# Patient Record
(demographics unavailable — no encounter records)

---

## 2024-11-14 NOTE — HISTORY OF PRESENT ILLNESS
[FreeTextEntry1] : Patient explains arthralgias overall at bay save intermittent RT knee pain without accompanied swelling.  He denies accompanied paresthesia.  He denies morning stiffness.   Patient following PMD for uncontrolled DM with Farxiga and Metformin; he finds the latter tx lends to abd discomfort.  Patient with bone density reflecting normal bone densitometry; he continues with vitamin supplementation.  He has missed the last few months of Certolizumab injections due to insurance lapse. He otherwise denies visual disturbances, oral ulcers, shortness of breath, chest pain, motor/sensory disturbances, Raynauds, rash or fever.

## 2024-11-14 NOTE — PROCEDURE
[Today's Date:] : Date: [unfilled] [Patient] : the patient [Risks] : risks [Benefits] : benefits [Therapeutic] : therapeutic [#1 Site: ______] : #1 site identified in the [unfilled] [Alcohol] : alcohol [Tolerated Well] : the patient tolerated the procedure well [No Complications] : there were no complications [FreeTextEntry5] : Certolizumab 200mg inj x 2 syringes  injected

## 2024-11-14 NOTE — ASSESSMENT
[FreeTextEntry1] : Patient with RA and joint contractures on biologic tx:  Patient to c/w Certolizumab, drug therapy administered in the office today and understands the increased risk of serious infections related to Biologic therapy and importance of annual vaccinations,  He would like to hold off on Flu vaccination today.  He understands increased risk of cardiovascular events in patients with inflammatory arthritides if uncontrolled, and understands the importance of sugar, lipid and blood pressure control. Surveillance labs requested.  Patient to continue with range of motion and stretching exercises to help relieve pain in the knees secondary to early OA as well as destructive changes from RA. NSAID tx to be taken judiciously.  Samples of emollient given. Elbow sleeves encouraged for joint stabilization. Quadriceps strengthening exercises demonstrated. PT strongly encouraged to help with contractures. Bone density up to date and reviewed; patient to continue with vitamin supplementation.  He is in agreement with the above plan and will return to the office within one months' time.

## 2024-11-14 NOTE — REVIEW OF SYSTEMS
[Chills] : chills [Arthralgias] : arthralgias [Joint Stiffness] : joint stiffness [Difficulty Walking] : difficulty walking [Fever] : no fever [Eye Pain] : no eye pain [Dry Eyes] : no dryness of the eyes [Sore Throat] : no sore throat [Hoarseness] : no hoarseness [Chest Pain] : no chest pain [Palpitations] : no palpitations [Shortness Of Breath] : no shortness of breath [SOB on Exertion] : no shortness of breath during exertion [Abdominal Pain] : no abdominal pain [Joint Swelling] : no joint swelling [Skin Lesions] : no skin lesions [Anxiety] : no anxiety [Depression] : no depression [Muscle Weakness] : no muscle weakness [Feelings Of Weakness] : no feelings of weakness [Easy Bleeding] : no tendency for easy bleeding [Easy Bruising] : no tendency for easy bruising

## 2024-11-14 NOTE — PHYSICAL EXAM
[General Appearance - Alert] : alert [General Appearance - In No Acute Distress] : in no acute distress [Sclera] : the sclera and conjunctiva were normal [Examination Of The Oral Cavity] : the lips and gums were normal [Neck Appearance] : the appearance of the neck was normal [Heart Rate And Rhythm] : heart rate was normal and rhythm regular [Heart Sounds Pericardial Friction Rub] : no pericardial rub [Edema] : there was no peripheral edema [No Spinal Tenderness] : no spinal tenderness [] : no rash [Skin Lesions] : no skin lesions [Motor Exam] : the motor exam was normal [Oriented To Time, Place, And Person] : oriented to person, place, and time [Impaired Insight] : insight and judgment were intact [FreeTextEntry1] : Minimal tenderness of the hands joints.   Contractures of the elbows ;  Antalgic gait with contracture of the right knee with min tenderness over the joint line with slight warmth without erythema appreciated.   Appropriate external rotation of the hips b/l.

## 2024-11-14 NOTE — CONSULT LETTER
[Dear  ___] : Dear  [unfilled], [Courtesy Letter:] : I had the pleasure of seeing your patient, [unfilled], in my office today. [Please see my note below.] : Please see my note below. [Consult Closing:] : Thank you very much for allowing me to participate in the care of this patient.  If you have any questions, please do not hesitate to contact me. [Sincerely,] : Sincerely, [DrPraful  ___] : Dr. HELMS [Minnie Jimenez MD] : Minnie Jimenez MD [ of Medicine] :  of Medicine [[facility]] : [facility] [FreeTextEntry2] : Mode Clements MD\par  [address]\[place]  [address] [FreeTextEntry3] : Minnie Jimenez M.D.\par   of Medicine \par  [facility] [employer] [facility]/[place]\par  \par

## 2024-12-17 NOTE — ASSESSMENT
[FreeTextEntry1] : Patient with RA and joint contractures on biologic tx:  Patient to c/w Certolizumab, drug therapy administered in the office today and understands the increased risk of serious infections related to Biologic therapy and importance of annual vaccinations,  He would like to hold off on Flu vaccination today.  He understands increased risk of cardiovascular events in patients with inflammatory arthritides if uncontrolled, and understands the importance of sugar, lipid and blood pressure control.  Discussed dietary plan in great detail in the setting of uncontrolled diabetes patient would like to focus on dietary modification before the initiation of insulin as mentioned during the encounter.  Patient to continue with range of motion and stretching exercises to help relieve pain in the knees secondary to early OA as well as destructive changes from RA. NSAID tx to be taken judiciously.  Elbow sleeves encouraged to help with contractures.  He is in agreement with the above plan and will return to the office within one months' time.

## 2024-12-17 NOTE — REVIEW OF SYSTEMS
[Fever] : no fever [Chills] : chills [Eye Pain] : no eye pain [Dry Eyes] : no dryness of the eyes [Sore Throat] : no sore throat [Hoarseness] : no hoarseness [Chest Pain] : no chest pain [Palpitations] : no palpitations [Shortness Of Breath] : no shortness of breath [SOB on Exertion] : no shortness of breath during exertion [Abdominal Pain] : no abdominal pain [Arthralgias] : arthralgias [Joint Swelling] : no joint swelling [Joint Stiffness] : joint stiffness [Skin Lesions] : no skin lesions [Difficulty Walking] : difficulty walking [Anxiety] : no anxiety [Depression] : no depression [Muscle Weakness] : no muscle weakness [Feelings Of Weakness] : no feelings of weakness [Easy Bleeding] : no tendency for easy bleeding [Easy Bruising] : no tendency for easy bruising

## 2024-12-17 NOTE — CONSULT LETTER
[Dear  ___] : Dear  [unfilled], [Courtesy Letter:] : I had the pleasure of seeing your patient, [unfilled], in my office today. [Please see my note below.] : Please see my note below. [Consult Closing:] : Thank you very much for allowing me to participate in the care of this patient.  If you have any questions, please do not hesitate to contact me. [Sincerely,] : Sincerely, [FreeTextEntry2] : Mode Clements MD\par  [address]\[place]  [address] [FreeTextEntry3] : Minnie Jimenez M.D.\par   of Medicine \par  [facility]/[place]\par  \par   [DrPraful  ___] : Dr. HELMS [Minnie Jimenez MD] : Minnie Jimenez MD [ of Medicine] :  of Medicine [[facility]] : [facility]

## 2024-12-17 NOTE — CONSULT LETTER
[Dear  ___] : Dear  [unfilled], [Courtesy Letter:] : I had the pleasure of seeing your patient, [unfilled], in my office today. [Please see my note below.] : Please see my note below. [Consult Closing:] : Thank you very much for allowing me to participate in the care of this patient.  If you have any questions, please do not hesitate to contact me. [Sincerely,] : Sincerely, [FreeTextEntry2] : Mode Clements MD\par  [address]\[place]  [address] [FreeTextEntry3] : Minnie Jimenez M.D.\par   of Medicine \par  [facility] at [facility]/[place]\par  \par   [DrPraful  ___] : Dr. HELMS [Minnie Jimenez MD] : Minnie Jimenez MD [ of Medicine] :  of Medicine [[facility]] : [facility]

## 2024-12-17 NOTE — HISTORY OF PRESENT ILLNESS
[FreeTextEntry1] : Patient explains arthralgias overall at bay .    He does note overuse of the right knee upon prolonged standing or climbing stairs will aggravate knee pain however is without accompanied swelling; he denies morning stiffness.  Recent blood testing reflects hemoglobin A1c in the high 9 range improved in the last 2 years as he was between the 11-12 range; patient following PMD for uncontrolled DM with Farxiga and Metformin however , he finds the latter tx lends to abdominal discomfort.  Kidney and liver function all within range with inflammatory markers at bay .  Bump in triglycerides noted as patient acknowledges increased fried food and peanut intake.  He also notes increased shellfish uptake Patient with bone density reflecting normal bone densitometry; he continues with vitamin supplementation.  He otherwise denies visual disturbances, oral ulcers, shortness of breath, chest pain, motor/sensory disturbances, Raynauds, rash or fever.

## 2025-01-22 NOTE — PROCEDURE
[Other Date:___] : Date: [unfilled] [Patient] : the patient [Risks] : risks [Benefits] : benefits [Therapeutic] : therapeutic [#1 Site: ______] : #1 site identified in the [unfilled] [Alcohol] : alcohol [Tolerated Well] : the patient tolerated the procedure well [No Complications] : there were no complications [FreeTextEntry5] : Certolizumab 200mg inj x 2 syringes  injected

## 2025-01-22 NOTE — CONSULT LETTER
[Dear  ___] : Dear  [unfilled], [Courtesy Letter:] : I had the pleasure of seeing your patient, [unfilled], in my office today. [Please see my note below.] : Please see my note below. [Consult Closing:] : Thank you very much for allowing me to participate in the care of this patient.  If you have any questions, please do not hesitate to contact me. [Sincerely,] : Sincerely, [FreeTextEntry2] : Mode Clements MD\par  [address]\[place]  [address] [FreeTextEntry3] : Minnie Jimenez M.D.\par   of Medicine \par  [facility] at [facility]/[place]\par  \par   [DrPraful  ___] : Dr. HELMS [Minnie Jimenez MD] : Minnie Jimenez MD [ of Medicine] :  of Medicine [[facility]] : [facility] at [facility]

## 2025-01-22 NOTE — HISTORY OF PRESENT ILLNESS
[FreeTextEntry1] : Patient explains arthralgias overall at bay .   RT knee upon prolonged standing or climbing stairs will aggravate knee pain however is without accompanied swelling; he denies morning stiffness.  Recent blood testing reflects hemoglobin A1c in the high 9 range improved in the last two years as he was between the 11-12 range; patient following PMD for uncontrolled DM with Farxiga and Metformin however , he finds therapy  lends to abdominal discomfort.  He cannot discern which medication may be lending to symptoms.   He is frustrated with DM diagnosis and inability to improve it.  Kidney and liver function all within range with inflammatory markers at bay .  Bump in triglycerides noted as patient acknowledges increased fried food and peanut intake.  He also notes increased shellfish uptake.  Patient with bone density reflecting normal bone densitometry; he continues with vitamin supplementation.  He otherwise denies visual disturbances, oral ulcers, shortness of breath, chest pain, motor/sensory disturbances, Raynauds, rash or fever.

## 2025-01-22 NOTE — ASSESSMENT
[FreeTextEntry1] : Patient with RA and joint contractures on biologic tx:  uncontrolled DM:  Patient to c/w Certolizumab, drug therapy administered in the office today and understands the increased risk of serious infections related to Biologic therapy and importance of annual vaccinations,  He would like to hold off on Flu vaccination today.  He understands increased risk of cardiovascular events in patients with inflammatory arthritides if uncontrolled, and understands the importance of sugar, lipid and blood pressure control.  Discussed dietary plan in great detail in the setting of uncontrolled diabetes patient would like to focus on dietary modification before the initiation of insulin as mentioned during the encounter.  Additional labs requested.  Endocrinology requisition given for further guidance.    Patient to continue with range of motion and stretching exercises to help relieve pain in the knees secondary to early OA as well as destructive changes from RA. NSAID tx to be taken judiciously.  Elbow sleeves encouraged to help with contractures.  He is in agreement with the above plan and will return to the office within one months' time.

## 2025-01-22 NOTE — CONSULT LETTER
[Dear  ___] : Dear  [unfilled], [Courtesy Letter:] : I had the pleasure of seeing your patient, [unfilled], in my office today. [Please see my note below.] : Please see my note below. [Consult Closing:] : Thank you very much for allowing me to participate in the care of this patient.  If you have any questions, please do not hesitate to contact me. [Sincerely,] : Sincerely, [FreeTextEntry2] : Mode Clements MD\par  [address]\[place]  [address] [FreeTextEntry3] : Minnie Jimenez M.D.\par   of Medicine \par  [facility] at [facility]/[place]\par  \par   [DrPraful  ___] : Dr. HELMS [Minnie Jimenez MD] : Minnie Jimenez MD [ of Medicine] :  of Medicine [[facility]] : [facility]

## 2025-05-06 NOTE — ASSESSMENT
[FreeTextEntry1] : Patient with RA and joint contractures on biologic tx:  uncontrolled DM:  Patient to c/w Certolizumab, drug therapy administered in the office today and understands the increased risk of serious infections related to Biologic therapy and importance of annual vaccinations,  He would like to hold off on Flu vaccination.  He understands increased risk of cardiovascular events in patients with inflammatory arthritides if uncontrolled, and understands the importance of sugar, lipid and blood pressure control.  Discussed dietary plan in great detail in the setting of uncontrolled diabetes patient would like to focus on dietary modification before the initiation of insulin as mentioned during the encounter.  Endocrinology follow-up strongly encouraged and requisition has been given for further guidance.   As in the past, we discussed the use of Rinvoq as an equivalent therapy to Cimzia and one that can afford him with much more independence; he understands the increased risk of shingles as well as the formation of blood clots, and thus the need for shingles vaccination as well as increased mobility activity, respectively, before initiation of therapy.   Patient to continue with range of motion and stretching exercises to help relieve pain in the knees secondary to early OA as well as destructive changes from RA. NSAID tx to be taken judiciously.  Quadriceps strengthening exercises demonstrated in the office.  Weight loss has been encouraged to reduce load over the medial joint line.  Viscosupplementation has been encouraged to provide additional lubrication and joint support.   Elbow sleeves encouraged to help with contractures.   He is in agreement with the above plan and will return to the office within one months' time.

## 2025-05-06 NOTE — REASON FOR VISIT
Detail Level: Detailed Quality 226: Preventive Care And Screening: Tobacco Use: Screening And Cessation Intervention: Patient screened for tobacco use and is an ex/non-smoker [Follow-Up: _____] : a [unfilled] follow-up visit [Source: ______] : History obtained from [unfilled]

## 2025-05-06 NOTE — CONSULT LETTER
[Dear  ___] : Dear  [unfilled], [Courtesy Letter:] : I had the pleasure of seeing your patient, [unfilled], in my office today. [Please see my note below.] : Please see my note below. [Consult Closing:] : Thank you very much for allowing me to participate in the care of this patient.  If you have any questions, please do not hesitate to contact me. [Sincerely,] : Sincerely, [FreeTextEntry2] : Mode Clements MD\par  [address]\[place]  [address] [FreeTextEntry3] : Minnie Jimenez M.D.\par   of Medicine \par  [facility]/[place]\par  \par   [DrPraful  ___] : Dr. HELMS [Minnie Jimenez MD] : Minnie Jimenez MD [ of Medicine] :  of Medicine [[facility]] : [facility]

## 2025-05-06 NOTE — HISTORY OF PRESENT ILLNESS
[FreeTextEntry1] : Patient explains arthralgias overall at bay .   RT knee upon prolonged standing or climbing stairs will aggravate knee pain however is without accompanied swelling; he denies morning stiffness.  He explains mild apprehension as the fishing season is near and he would like to avoid instability symptoms which worsen around the time of fishing activity.  Family explains wanting to transition to new therapy as discussed in previous visits, as they are having a difficult time in obtaining certolizumab pegol junctions through insurance.   Daughter explains recent blood testing through PMD she was told the patient's blood sugar was twice within normal limits; patient following PMD for uncontrolled DM with Farxiga and Metformin however , he finds therapy lends to abdominal discomfort.  He cannot discern which medication may be lending to symptoms.   He is frustrated with DM diagnosis and inability to improve it and has discontinued both therapies.  Daughter also explains an increase in cholesterol panel noted; patient refrains from statin therapy. Patient with bone density reflecting normal bone densitometry; he continues with vitamin supplementation.  He otherwise denies visual disturbances, oral ulcers, shortness of breath, chest pain, motor/sensory disturbances, Raynauds, rash or fever.

## 2025-06-03 NOTE — PROCEDURE
[Today's Date:] : Date: [unfilled] [Patient] : the patient [Risks] : risks [Benefits] : benefits [Therapeutic] : therapeutic [#1 Site: ______] : #1 site identified in the [unfilled] [25 gauge 1 inch] : A 25 gauge 1 inch needle was used [Tolerated Well] : the patient tolerated the procedure well [No Complications] : there were no complications [Patient Instructed to Call] : patient was instructed to call if redness at site, a decrease in range of motion or an increase in pain is noted after procedure. [de-identified] : Shingrix, reconstituted 0.5mL injected

## 2025-06-03 NOTE — ASSESSMENT
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   Patient to continue with certolizumab pegol tx, next injection two weeks from today, and will return to the office in two months'  time for second Shingrix injection.

## 2025-07-29 NOTE — ASSESSMENT
[FreeTextEntry1] : Patient with RA and joint contractures ,   Would like to transition patient to Avelino kinase inhibitor as it would afford him much more independence; he understands increased risk of shingles as well as the formation of blood clots and thus will complete shingles vaccination series prior to therapy.  Surveillance labs requested.   He understands increased risk of cardiovascular events in patients with inflammatory arthritides if uncontrolled, and understands the importance of sugar, lipid and blood pressure control.  Discussed dietary plan in great detail in the setting of uncontrolled diabetes patient would like to focus on dietary modification before the initiation of insulin as mentioned during the encounter.  Endocrinology follow-up strongly encouraged.  Patient to continue with range of motion and stretching exercises to help relieve pain in the knees secondary to early OA as well as destructive changes from RA. NSAID tx to be taken judiciously.  Quadriceps strengthening exercises demonstrated in the office.  Weight loss has been encouraged to reduce load over the medial joint line.  Viscosupplementation has been encouraged to provide additional lubrication and joint support.   Elbow sleeves encouraged to help with contractures.   He is in agreement with the above plan and will return to the office within one months' time.

## 2025-07-29 NOTE — PHYSICAL EXAM
[FreeTextEntry1] : Minimal tenderness of the hands joints.   Contractures of the elbows ;  Antalgic gait with contracture of the right knee with min tenderness over the joint line with slight warmth without erythema appreciated.   Appropriate external rotation of the hips b/l.

## 2025-07-29 NOTE — HISTORY OF PRESENT ILLNESS
[FreeTextEntry1] : Patient explains arthralgias overall at bay .   RT knee upon prolonged standing or climbing stairs will aggravate knee pain however is without accompanied swelling; he denies morning stiffness.  He explains since being off of medication, he is experienced minimal pain and stiffness over the knee and has been able to finish and walk in the water for several feet without difficulty.  Patient had received the shingrix vaccination two months ago and awaits second dosing as he would like to transition to new therapy as he finds certolizumab patient currently off of metformin and Farxiga offering less relief as it once used to. It is unclear whether patient is taking metformin and farxiga for historically, uncontrolled diabetes, as he finds therapy lends to abdominal discomfort.  He cannot discern which medication may be lending to symptoms.   He is frustrated with DM diagnosis and inability to improve it .  Daughter also explains an increase in cholesterol panel noted; patient refrains from statin therapy. Patient with bone density reflecting normal bone densitometry; he continues with vitamin supplementation.  He otherwise denies visual disturbances, oral ulcers, shortness of breath, chest pain, motor/sensory disturbances, Raynauds, rash or fever.

## 2025-07-29 NOTE — REVIEW OF SYSTEMS
[Fever] : no fever [Eye Pain] : no eye pain [Dry Eyes] : no dryness of the eyes [Sore Throat] : no sore throat [Hoarseness] : no hoarseness [Chest Pain] : no chest pain [Palpitations] : no palpitations [Shortness Of Breath] : no shortness of breath [SOB on Exertion] : no shortness of breath during exertion [Abdominal Pain] : no abdominal pain [Joint Swelling] : no joint swelling [Skin Lesions] : no skin lesions [Anxiety] : no anxiety [Depression] : no depression [Muscle Weakness] : no muscle weakness [Feelings Of Weakness] : no feelings of weakness [Easy Bleeding] : no tendency for easy bleeding [Easy Bruising] : no tendency for easy bruising

## 2025-07-29 NOTE — CONSULT LETTER
[FreeTextEntry2] : Mode Clements MD\par  [address]\[place]  [address] [FreeTextEntry3] : Minnie Jimenez M.D.\par   of Medicine \par  [facility] at [facility]\par  \par